# Patient Record
Sex: FEMALE | Race: OTHER | Employment: UNEMPLOYED | URBAN - METROPOLITAN AREA
[De-identification: names, ages, dates, MRNs, and addresses within clinical notes are randomized per-mention and may not be internally consistent; named-entity substitution may affect disease eponyms.]

---
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
## 2022-02-16 ENCOUNTER — HOSPITAL ENCOUNTER (EMERGENCY)
Age: 22
Discharge: HOME | End: 2022-02-16
Attending: STUDENT IN AN ORGANIZED HEALTH CARE EDUCATION/TRAINING PROGRAM

## 2022-02-16 VITALS
BODY MASS INDEX: 17.47 KG/M2 | OXYGEN SATURATION: 100 % | TEMPERATURE: 97.9 F | RESPIRATION RATE: 18 BRPM | DIASTOLIC BLOOD PRESSURE: 89 MMHG | SYSTOLIC BLOOD PRESSURE: 106 MMHG | WEIGHT: 115.3 LBS | HEIGHT: 68 IN | HEART RATE: 86 BPM

## 2022-02-16 DIAGNOSIS — K02.9 PAIN DUE TO DENTAL CARIES: Primary | ICD-10-CM

## 2022-02-16 PROCEDURE — 74011000250 HC RX REV CODE- 250: Performed by: PHYSICIAN ASSISTANT

## 2022-02-16 PROCEDURE — 75810000283 HC INJECTION NERVE BLOCK

## 2022-02-16 PROCEDURE — 99283 EMERGENCY DEPT VISIT LOW MDM: CPT

## 2022-02-16 PROCEDURE — 74011250637 HC RX REV CODE- 250/637: Performed by: PHYSICIAN ASSISTANT

## 2022-02-16 RX ORDER — ACETAMINOPHEN 325 MG/1
650 TABLET ORAL
Qty: 20 TABLET | Refills: 0 | Status: SHIPPED | OUTPATIENT
Start: 2022-02-16

## 2022-02-16 RX ORDER — AMOXICILLIN 250 MG/1
250 CAPSULE ORAL 2 TIMES DAILY
Qty: 14 CAPSULE | Refills: 0 | Status: SHIPPED | OUTPATIENT
Start: 2022-02-16

## 2022-02-16 RX ORDER — LIDOCAINE HYDROCHLORIDE AND EPINEPHRINE 20; 10 MG/ML; UG/ML
3 INJECTION, SOLUTION INFILTRATION; PERINEURAL
Status: COMPLETED | OUTPATIENT
Start: 2022-02-16 | End: 2022-02-16

## 2022-02-16 RX ADMIN — LIDOCAINE HYDROCHLORIDE AND EPINEPHRINE 60 MG: 20; 10 INJECTION, SOLUTION INFILTRATION; PERINEURAL at 22:20

## 2022-02-16 RX ADMIN — BENZOCAINE, BUTAMBEN, AND TETRACAINE HYDROCHLORIDE: .028; .004; .004 AEROSOL, SPRAY TOPICAL at 22:25

## 2022-02-17 NOTE — DISCHARGE INSTRUCTIONS
Emergency [address] by [address] [facility]  [address], [address]  Open M, W, F: 8AM - 5PM and T, Th: 8AM-6PM  Phone: [phone], press 4  $70 for Emergency Care  $60 for first routine care, then pay by sliding scale based upon income. [facility] [address] [facility]  Phone: [phone]    [employer]  [address] [facility] [place]  Open Monday - Friday 8AM - 4:30 PM  Phone: [address] [place] [address] Dentistry Urgent [address] [place] [address] Dentistry, [address] [place] [address], [place] [address], [address] [place] [address] starting at 8:30 AM M-F  Phone: [phone], press 2  Fee: $150 per tooth (x-ray & extractions only)  Pediatrics Phone[de-identified] [phone], 8-5 M-F    [address] Dentistry, [address] [address], 2nd Floor, [address] [place] [address] starting at 8:30 AM - 3 PM [address] [place] [address] St  [address] [facility], [address] [place] [address]  Phone: [phone] or [phone]  Emergency Hours: 9:30AM - 11AM (extractions)  Simple tooth extraction $ per tooth. #75 for x-ray    [facility] Residents only, over the age of 25  Phone: [phone]. Leave message saying you need an appointment to register.   Hours: Tuesday Evenings

## 2022-02-17 NOTE — ED PROVIDER NOTES
EMERGENCY DEPARTMENT HISTORY AND PHYSICAL EXAM      Date: 2/16/2022  Patient Name: Bela Saunders    History of Presenting Illness     Chief Complaint   Patient presents with    Facial Pain     pt presents w/ left sided facial pain x4 days. pt called dentist appt scheduled for 2 weeks. pt is pregnant so unable to take medications        History Provided By: Patient    HPI: Bela Saunders, 25 y.o. female presents ambulatory with her cousin to the ED with cc of 4 days of moderately severe and constant left upper dental pain that is worse with eating and drinking. She tells me she is pregnant and is uncertain what medications she can take. She tells me she is afraid that antibiotics may hurt her baby. [place] is her primary language however she is able to communicate effectively in [place]. Her cousin is with her who also speaks [place] and [place]. There has been no fever lately. She denies ear pain. There is no difficulty swallowing. She denies any known medication allergies. She tells me she did schedule a dentist appointment however it is in about 2 weeks. There are no other complaints, changes, or physical findings at this time. PCP: None      Past History     Past Medical History:  No past medical history on file. Past Surgical History:  No past surgical history on file. Family History:  No family history on file. Social History:  Social History     Tobacco Use    Smoking status: Not on file    Smokeless tobacco: Not on file   Substance Use Topics    Alcohol use: Not on file    Drug use: Not on file       Allergies:  No Known Allergies  Review of Systems   Review of Systems   Constitutional: Negative for fever. HENT: Positive for dental problem. Negative for trouble swallowing. All other systems reviewed and are negative. Physical Exam   Physical Exam  Vitals and nursing note reviewed. Constitutional:       General: She is not in acute distress.      Appearance: She is well-developed. She is not toxic-appearing. HENT:      Head: Normocephalic and atraumatic. Jaw: No trismus. Right Ear: External ear normal.      Left Ear: External ear normal.      Ears:      Comments:   As unobstructed bilaterally and TMs are translucent bilaterally without erythema     Nose: Nose normal.      Mouth/Throat:      Dentition: Dental tenderness present. Pharynx: Uvula midline. Comments:   No facial swelling  No trismus  There may be very mild decay of the left upper third molar. There is tenderness of the left upper third molar. There is no abscess. Patient has good oral hygiene. Eyes:      General: No scleral icterus. Conjunctiva/sclera: Conjunctivae normal.      Pupils: Pupils are equal, round, and reactive to light. Cardiovascular:      Rate and Rhythm: Normal rate and regular rhythm. Pulmonary:      Effort: Pulmonary effort is normal. No respiratory distress. Abdominal:      Palpations: Abdomen is soft. Tenderness: There is no abdominal tenderness. Musculoskeletal:         General: Normal range of motion. Cervical back: Normal range of motion. Skin:     Findings: No rash. Neurological:      Mental Status: She is alert and oriented to person, place, and time. She is not disoriented. GCS: GCS eye subscore is 4. GCS verbal subscore is 5. GCS motor subscore is 6. Cranial Nerves: No cranial nerve deficit. Psychiatric:         Speech: Speech normal.       Diagnostic Study Results     Labs -   No results found for this or any previous visit (from the past 12 hour(s)). Radiologic Studies -   No orders to display     CT Results  (Last 48 hours)    None        CXR Results  (Last 48 hours)    None        Medical Decision Making   I am the first provider for this patient. I reviewed the vital signs, available nursing notes, past medical history, past surgical history, family history and social history.     Vital Signs-Reviewed the patient's vital signs. Patient Vitals for the past 12 hrs:   Temp Pulse Resp BP SpO2   02/16/22 2046 97.9 °F (36.6 °C) 86 18 106/89 100 %       Pulse Oximetry Analysis - 100% on RA    Records Reviewed: Nursing Notes and Old Medical Records    Provider Notes (Medical Decision Making):   DDx: Dental caries, dental abscess    Procedure Note - Dental Block:    10:34 PM  Performed by Zeeshan Nieves PA-C. A posterior superior alveolar nerve block was performed on the left upper side. 2 mL of a 50-50 mix of 2% lidocaine with epinephrine and 0.5% bupivacaine without epinephrine was injected. Total time at bedside, performing this procedure was 1-15 minutes. The procedure was tolerated well without any complications. ED Course:   Initial assessment performed. The patients presenting problems have been discussed, and they are in agreement with the care plan formulated and outlined with them. I have encouraged them to ask questions as they arise throughout their visit. Disposition:  Discharge    PLAN:  1. Discharge Medication List as of 2/16/2022 10:21 PM      START taking these medications    Details   amoxicillin (AMOXIL) 250 mg capsule Take 1 Capsule by mouth two (2) times a day., Normal, Disp-14 Capsule, R-0      acetaminophen (TYLENOL) 325 mg tablet Take 2 Tablets by mouth every six (6) hours as needed for Pain., Normal, Disp-20 Tablet, R-0           2. Follow-up Information     Follow up With Specialties Details Why Contact Info    [address]  Call  DENTAL SERVICES: as needed [address]  [phone]        Return to ED if worse     Diagnosis     Clinical Impression:   1.  Pain due to dental caries

## 2022-02-20 ENCOUNTER — HOSPITAL ENCOUNTER (EMERGENCY)
Age: 22
Discharge: HOME | End: 2022-02-20
Attending: EMERGENCY MEDICINE

## 2022-02-20 VITALS
TEMPERATURE: 98.6 F | OXYGEN SATURATION: 100 % | BODY MASS INDEX: 17.51 KG/M2 | SYSTOLIC BLOOD PRESSURE: 102 MMHG | HEART RATE: 93 BPM | DIASTOLIC BLOOD PRESSURE: 78 MMHG | RESPIRATION RATE: 16 BRPM | WEIGHT: 115.52 LBS | HEIGHT: 68 IN

## 2022-02-20 DIAGNOSIS — R22.0 LEFT FACIAL SWELLING: Primary | ICD-10-CM

## 2022-02-20 DIAGNOSIS — K02.9 PAIN DUE TO DENTAL CARIES: ICD-10-CM

## 2022-02-20 PROCEDURE — 74011000250 HC RX REV CODE- 250: Performed by: PHYSICIAN ASSISTANT

## 2022-02-20 PROCEDURE — 99282 EMERGENCY DEPT VISIT SF MDM: CPT

## 2022-02-20 PROCEDURE — 74011250637 HC RX REV CODE- 250/637: Performed by: PHYSICIAN ASSISTANT

## 2022-02-20 RX ORDER — ACETAMINOPHEN 325 MG/1
650 TABLET ORAL
Qty: 20 TABLET | Refills: 0 | Status: SHIPPED | OUTPATIENT
Start: 2022-02-20

## 2022-02-20 RX ADMIN — LIDOCAINE HYDROCHLORIDE: 20 SOLUTION ORAL; TOPICAL at 15:55

## 2022-02-20 NOTE — ED PROVIDER NOTES
EMERGENCY DEPARTMENT HISTORY AND PHYSICAL EXAM      Date: 2/20/2022  Patient Name: Kal Zafar    History of Presenting Illness     Chief Complaint   Patient presents with    Dental Pain     Pt ambulatory into triage with a cc of left sided dental pain x 1 week; pt has not seen dentist for this pain; swelling noted to left jaw; pt is 21 weeks pregnant; not pregnancy complaints at this time. History Provided By: Patient    HPI: Kal Zafar, 25 y.o. female with no past medical history who presents emergency department with dental pain. [facility] is [facility] but states she does not want a  and desires to communicate in [place]. She comes to the emergency department complaining of left-sided dental pain and mild left-sided facial swelling. She was seen here on 2/16/2022 for identical symptoms and she denies any new or worsening symptoms since that time. She has been taking the Tylenol as prescribed but has not taken any of the antibiotics. She denies any fevers, difficulty breathing, sore throat, chest pain, shortness of breath, bowel pain, nausea, vomiting, diarrhea. She is approximately 21 weeks pregnant but denies any complications or concerns about her pregnancy. She has attempted to establish with a dentist but states she \"needs a note \"from her OB, who she is scheduled to see tomorrow. There are no other complaints, changes, or physical findings at this time. PCP: None    No current facility-administered medications on file prior to encounter. Current Outpatient Medications on File Prior to Encounter   Medication Sig Dispense Refill    amoxicillin (AMOXIL) 250 mg capsule Take 1 Capsule by mouth two (2) times a day. 14 Capsule 0    acetaminophen (TYLENOL) 325 mg tablet Take 2 Tablets by mouth every six (6) hours as needed for Pain. 20 Tablet 0       Past History     Past Medical History:  No past medical history on file.     Past Surgical History:  No past surgical history on file. Family History:  No family history on file. Social History:  Social History     Tobacco Use    Smoking status: Not on file    Smokeless tobacco: Not on file   Substance Use Topics    Alcohol use: Not on file    Drug use: Not on file       Allergies:  No Known Allergies      Review of Systems   Review of Systems   Constitutional: Negative for chills and fever. HENT: Positive for dental problem. Negative for congestion, sore throat, trouble swallowing and voice change. Respiratory: Negative for cough and shortness of breath. Cardiovascular: Negative for chest pain. Gastrointestinal: Negative for abdominal pain, diarrhea, nausea and vomiting. Musculoskeletal: Negative for myalgias. Skin: Negative for rash. Neurological: Positive for headaches. All other systems reviewed and are negative. Physical Exam   Physical Exam  Vitals and nursing note reviewed. Constitutional:       General: She is not in acute distress. Appearance: She is not toxic-appearing. HENT:      Head: Normocephalic and atraumatic. Right Ear: External ear normal.      Left Ear: External ear normal.      Nose: Nose normal.      Mouth/Throat:      Mouth: Mucous membranes are moist.      Comments: No tripoding, drooling, stridor. Oropharynx normal to inspection. Uvula midline with no asymmetry or signs of peritonsillar abscess. Tongue normal to inspection with no swelling or erythema. No sublingual erythema or induration. Neck normal to inspection with no bulging, anterior tenderness or redness, or pain with active/passive range of motion. Tenderness with percussion of third maxillary molar. Mild gingival swelling, but no erythema, fluctuance, abscess, sloughing tissue or signs of necrotizing infection. Remaining dentition normal to inspection. Patient has good dentition  Eyes:      Extraocular Movements: Extraocular movements intact.       Conjunctiva/sclera: Conjunctivae normal.   Cardiovascular:      Rate and Rhythm: Normal rate and regular rhythm. Pulses: Normal pulses. Heart sounds: Normal heart sounds. No murmur heard. No friction rub. No gallop. Pulmonary:      Effort: Pulmonary effort is normal. No respiratory distress. Breath sounds: Normal breath sounds. No stridor. No wheezing, rhonchi or rales. Abdominal:      General: Abdomen is flat. There is no distension. Palpations: Abdomen is soft. Tenderness: There is no abdominal tenderness. There is no guarding or rebound. Musculoskeletal:         General: No swelling. Cervical back: Neck supple. Skin:     General: Skin is warm. Neurological:      Mental Status: She is alert and oriented to person, place, and time. Psychiatric:         Mood and Affect: Mood normal.         Behavior: Behavior normal.         Thought Content: Thought content normal.         Judgment: Judgment normal.         Diagnostic Study Results     Labs -   No results found for this or any previous visit (from the past 12 hour(s)). Radiologic Studies -   No orders to display     CT Results  (Last 48 hours)    None        CXR Results  (Last 48 hours)    None            Medical Decision Making   I am the first provider for this patient. I reviewed the vital signs, available nursing notes, past medical history, past surgical history, family history and social history. Vital Signs-Reviewed the patient's vital signs. Patient Vitals for the past 12 hrs:   Temp Pulse Resp BP SpO2   02/20/22 1534 98.6 °F (37 °C) 93 16 102/78 100 %       Records Reviewed: Nursing Notes    Provider Notes (Medical Decision Making):   Patient had no concerning signs or symptoms of an emergent ENT process, including peritonsillar abscess, retropharyngeal abscess, Tristan's angina, dental abscess, or necrotizing gingivitis. Patient was given dental balls for pain, counseled on use of Tylenol.   She has good follow-up as she is seeing her OB tomorrow and is in the course of making an appointment with her dentist.  Patient urged on close follow-up with a dentist and advised and return precautions to the emergency department. Patient expressed understanding and agreement with the discharge instructions and treatment plan. ED Course:   Initial assessment performed. The patients presenting problems have been discussed, and they are in agreement with the care plan formulated and outlined with them. I have encouraged them to ask questions as they arise throughout their visit. Critical Care Time: None    Disposition:  discharged    PLAN:  1. Current Discharge Medication List      START taking these medications    Details   !! acetaminophen (TYLENOL) 325 mg tablet Take 2 Tablets by mouth every four (4) hours as needed for Pain. Qty: 20 Tablet, Refills: 0  Start date: 2/20/2022       !! - Potential duplicate medications found. Please discuss with provider. CONTINUE these medications which have NOT CHANGED    Details   !! acetaminophen (TYLENOL) 325 mg tablet Take 2 Tablets by mouth every six (6) hours as needed for Pain. Qty: 20 Tablet, Refills: 0       !! - Potential duplicate medications found. Please discuss with provider. 2.   Follow-up Information     Follow up With Specialties Details Why [address] [facility] Dentistry In 3 days  [address] [facility]  [phone]    [facility] EMERGENCY DEPT Emergency Medicine  If symptoms worsen [address] [place] [address] Drive  [address] [facility]  [phone]        Return to ED if worse     Diagnosis     Clinical Impression:   1. Left facial swelling    2. Pain due to dental caries          Please note that this dictation was completed with [employer], the computer voice recognition software. Quite often unanticipated grammatical, syntax, homophones, and other interpretive errors are inadvertently transcribed by the computer software.  Please disregards these errors. Please excuse any errors that have escaped final proofreading.